# Patient Record
Sex: FEMALE | Race: OTHER | NOT HISPANIC OR LATINO
[De-identification: names, ages, dates, MRNs, and addresses within clinical notes are randomized per-mention and may not be internally consistent; named-entity substitution may affect disease eponyms.]

---
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
## 2024-02-09 ENCOUNTER — APPOINTMENT (OUTPATIENT)
Dept: PEDIATRIC HEMATOLOGY/ONCOLOGY | Facility: CLINIC | Age: 19
End: 2024-02-09

## 2024-04-12 ENCOUNTER — TRANSCRIPTION ENCOUNTER (OUTPATIENT)
Age: 19
End: 2024-04-12

## 2024-04-12 ENCOUNTER — APPOINTMENT (OUTPATIENT)
Age: 19
End: 2024-04-12
Payer: MEDICAID

## 2024-04-12 ENCOUNTER — LABORATORY RESULT (OUTPATIENT)
Age: 19
End: 2024-04-12

## 2024-04-12 ENCOUNTER — OUTPATIENT (OUTPATIENT)
Dept: OUTPATIENT SERVICES | Facility: HOSPITAL | Age: 19
LOS: 1 days | End: 2024-04-12
Payer: MEDICAID

## 2024-04-12 VITALS
SYSTOLIC BLOOD PRESSURE: 114 MMHG | WEIGHT: 145.73 LBS | HEART RATE: 80 BPM | DIASTOLIC BLOOD PRESSURE: 72 MMHG | BODY MASS INDEX: 22.61 KG/M2 | RESPIRATION RATE: 16 BRPM | OXYGEN SATURATION: 100 % | TEMPERATURE: 98.3 F | HEIGHT: 67.32 IN

## 2024-04-12 DIAGNOSIS — D50.8 OTHER IRON DEFICIENCY ANEMIAS: ICD-10-CM

## 2024-04-12 PROCEDURE — 83540 ASSAY OF IRON: CPT

## 2024-04-12 PROCEDURE — 99214 OFFICE O/P EST MOD 30 MIN: CPT

## 2024-04-12 PROCEDURE — 85027 COMPLETE CBC AUTOMATED: CPT

## 2024-04-12 PROCEDURE — 85046 RETICYTE/HGB CONCENTRATE: CPT

## 2024-04-12 PROCEDURE — 82728 ASSAY OF FERRITIN: CPT

## 2024-04-12 PROCEDURE — 83550 IRON BINDING TEST: CPT

## 2024-04-13 DIAGNOSIS — D50.8 OTHER IRON DEFICIENCY ANEMIAS: ICD-10-CM

## 2024-04-21 LAB
FERRITIN SERPL-MCNC: 38 NG/ML
HCT VFR BLD CALC: 38.6 %
HGB BLD-MCNC: 13.1 G/DL
IRON SATN MFR SERPL: 32 %
IRON SERPL-MCNC: 95 UG/DL
MCHC RBC-ENTMCNC: 28.4 PG
MCHC RBC-ENTMCNC: 33.9 G/DL
MCV RBC AUTO: 83.7 FL
PLATELET # BLD AUTO: 231 K/UL
PMV BLD: 10.5 FL
RBC # BLD: 4.61 M/UL
RBC # FLD: 12.5 %
RETICS # AUTO: 0.8 %
RETICS AGGREG/RBC NFR: 38.3 K/UL
TIBC SERPL-MCNC: 301 UG/DL
UIBC SERPL-MCNC: 206 UG/DL
WBC # FLD AUTO: 4.65 K/UL

## 2024-04-21 NOTE — END OF VISIT
[FreeTextEntry3] : pt seen and examined. 17 yo with h/o iron def anemia here for f/u.  Feeling more tired.  ferritin and hgb nromal but ferritin has trended down since it was checked after iron infusions.  recommend considering 1 tab nova ferrum/day or and mvi with iron and f/u in 2 months.  recommend cardiology eval and f/u with PMD. [Time Spent: ___ minutes] : I have spent [unfilled] minutes of time on the encounter.

## 2024-04-21 NOTE — HISTORY OF PRESENT ILLNESS
[No Feeding Issues] : no feeding issues at this time [de-identified] : This is a routine visit for this 19 y/o female with h/o iron deficiency anemia.  Last course of Venofer completed in October 2023.  Patient reports that she initially had been feeling well after completion of Venofer course and now feels like energy level is down slightly.  States that she did have URI last week and had some shortness of breath.  Notes that two separate times she felt like her heart rate was low and reports that she felt lightheaded.  Denies dizziness or headaches during that time.  Denies fainting.  No reports of abdominal pain, bloating, diarrhea or bloody stool.  Reports improved appetite and staying well hydrated.

## 2024-04-21 NOTE — CONSULT LETTER
[Dear  ___] : Dear  [unfilled], [Courtesy Letter:] : I had the pleasure of seeing your patient, [unfilled], in my office today. [Please see my note below.] : Please see my note below. [Consult Closing:] : Thank you very much for allowing me to participate in the care of this patient.  If you have any questions, please do not hesitate to contact me. [Sincerely,] : Sincerely, [FreeTextEntry2] : Dr Hennessy [FreeTextEntry3] : Yolis Bean MD Pediatric Hematology/Oncology [place] [phone]

## 2024-04-25 RX ORDER — CHLORHEXIDINE GLUCONATE 4 %
325 (65 FE) LIQUID (ML) TOPICAL DAILY
Qty: 120 | Refills: 0 | Status: COMPLETED | COMMUNITY
Start: 2023-07-07 | End: 2024-04-25

## 2024-04-25 RX ORDER — CHLORHEXIDINE GLUCONATE 4 %
325 (65 FE) LIQUID (ML) TOPICAL
Qty: 30 | Refills: 3 | Status: ACTIVE | COMMUNITY
Start: 2024-04-25 | End: 1900-01-01

## 2024-06-07 ENCOUNTER — APPOINTMENT (OUTPATIENT)
Age: 19
End: 2024-06-07

## 2024-06-21 ENCOUNTER — APPOINTMENT (OUTPATIENT)
Age: 19
End: 2024-06-21
Payer: MEDICAID

## 2024-06-21 ENCOUNTER — LABORATORY RESULT (OUTPATIENT)
Age: 19
End: 2024-06-21

## 2024-06-21 ENCOUNTER — OUTPATIENT (OUTPATIENT)
Dept: OUTPATIENT SERVICES | Facility: HOSPITAL | Age: 19
LOS: 1 days | End: 2024-06-21
Payer: MEDICAID

## 2024-06-21 VITALS
RESPIRATION RATE: 16 BRPM | HEART RATE: 72 BPM | TEMPERATURE: 98.2 F | WEIGHT: 144.62 LBS | SYSTOLIC BLOOD PRESSURE: 101 MMHG | DIASTOLIC BLOOD PRESSURE: 67 MMHG

## 2024-06-21 DIAGNOSIS — D50.8 OTHER IRON DEFICIENCY ANEMIAS: ICD-10-CM

## 2024-06-21 PROCEDURE — 82728 ASSAY OF FERRITIN: CPT

## 2024-06-21 PROCEDURE — 85046 RETICYTE/HGB CONCENTRATE: CPT

## 2024-06-21 PROCEDURE — 83540 ASSAY OF IRON: CPT

## 2024-06-21 PROCEDURE — 83550 IRON BINDING TEST: CPT

## 2024-06-21 PROCEDURE — 85027 COMPLETE CBC AUTOMATED: CPT

## 2024-06-21 PROCEDURE — 99214 OFFICE O/P EST MOD 30 MIN: CPT

## 2024-06-22 DIAGNOSIS — D50.8 OTHER IRON DEFICIENCY ANEMIAS: ICD-10-CM

## 2024-06-26 LAB
FERRITIN SERPL-MCNC: 26 NG/ML
HCT VFR BLD CALC: 37.8 %
HGB BLD-MCNC: 12.4 G/DL
IRON SATN MFR SERPL: 25 %
IRON SERPL-MCNC: 79 UG/DL
MCHC RBC-ENTMCNC: 27.7 PG
MCHC RBC-ENTMCNC: 32.8 G/DL
MCV RBC AUTO: 84.6 FL
PLATELET # BLD AUTO: 222 K/UL
PMV BLD: 10 FL
RBC # BLD: 4.47 M/UL
RBC # FLD: 12.5 %
RETICS # AUTO: 0.8 %
RETICS AGGREG/RBC NFR: 34.9 K/UL
TIBC SERPL-MCNC: 311 UG/DL
UIBC SERPL-MCNC: 232 UG/DL
WBC # FLD AUTO: 5.29 K/UL

## 2024-06-26 NOTE — HISTORY OF PRESENT ILLNESS
[No Feeding Issues] : no feeding issues at this time [de-identified] : This is a scheduled follow up visit for this 17 y/o female with iron deficiency anemia.  Patient last seen in clinic in April with plans to continue MVI with iron or Ferrous sulfate daily. Patient had opted to start Ferrous sulfate however reports that her scheduled had prevented her from getting the new prescription and she did not start it until 2 weeks ago.  Otherwise patient states that she has been feeling well. Denies headaches.  Reports some fatigue however feels that she hasn't been sleeping well due to school schedule.  Denies shortness of breath or difficulty breathing. Denies unusual bruising or bleeding.  States that she has been eating well.  No other concerns noted.

## 2024-06-26 NOTE — END OF VISIT
[FreeTextEntry3] : pt seen and examined. agree with note above. just restarted ferrous sulfate- recommended to continue ferrous sulfate supplement and f/u in 2 months or sooner with any concerns [Time Spent: ___ minutes] : I have spent [unfilled] minutes of time on the encounter.

## 2024-08-02 ENCOUNTER — APPOINTMENT (OUTPATIENT)
Age: 19
End: 2024-08-02

## 2025-09-07 ENCOUNTER — NON-APPOINTMENT (OUTPATIENT)
Age: 20
End: 2025-09-07